# Patient Record
(demographics unavailable — no encounter records)

---

## 2021-04-06 NOTE — EMERGENCY DEPARTMENT REPORT
Minor Respiratory





- [facility]


Chief Complaint: Upper Respiratory Infection


Stated Complaint: COUGH


Time Seen by Provider: 04/06/21 22:55


Duration: 8 days


Severity: moderate


Minor Respiratory: Yes Rhinorrhea, Yes Cough, Yes Shortness of Breath


Other History: 58-year-old -American male with a current history of COPD 

and asthma presents to the emergency room complaining of shortness of breath and

cough that is steady for the last 8 days.  Patient denies any fever chills no 

nausea no vomiting.  Patient reported to nurse that he has been coughing up 

yellow mucus.





ED Review of Systems


ROS: 


Stated complaint: COUGH


Other details as noted in HPI








ED Past Medical Hx





- Social History


Smoking Status: Current Every Day Smoker





- Medications


Home Medications: 


                                Home Medications











 Medication  Instructions  Recorded  Confirmed  Last Taken  Type


 


Azithromycin [Zithromax Z-JANET] 250 mg PO DAILY #6 tab 04/07/21  Unknown Rx


 


Benzonatate [Tessalon Perles] 100 mg PO Q8HR #15 capsule 04/07/21  Unknown Rx


 


Prednisone [predniSONE 5 mg (6-Day 5 mg PO .TAPER #1 tab.ds.pk 04/07/21  Unknown

 Rx





Pack, 21 Tabs)]     














Minor Respiratory Exam





- Exam


General: 


Vital signs noted. No distress. Alert and acting appropriately.





HEENT: Yes Moist Mucous Membranes, No Pharyngeal Erythema, No Pharyngeal 

Exudates, No Rhinorrhea, No Conjuctival Injection, No Frontal Tenderness, No 

Maxillary Tenderness


Neck: Yes Supple, No Adenopathy


Lungs: Yes Good Air Exchange, Yes Cough, No Wheezes, No Ronchi, No Stridor, No 

Labored Respirations, No Retractions, No Use of Accessory Muscles, No Other 

Abnormal Lung Sounds


Heart: Yes Regular, No Murmur


Abdomen: Yes Normal Bowel Sounds, No Tenderness, No Peritoneal Signs


Skin: No Rash, No Edema


Neurologic: 


Alert and oriented, no deficits.








Musculoskeletal: 


Unremarkable.











ED Course


                                   Vital Signs











  04/06/21





  22:54


 


Temperature 98.5 F


 


Pulse Rate 101 H


 


Respiratory 20





Rate 


 


Blood Pressure 137/88


 


O2 Sat by Pulse 95





Oximetry 














- Reevaluation(s)


Reevaluation #1: 





04/07/21 00:11


Patient lung exam is clear to auscultation.





ED Medical Decision Making





- Radiology Data


Radiology results: report reviewed





58-year-old -American male with a current history of COPD and asthma 

presents to the emergency room complaining of shortness of breath and cough that

is steady for the last 8 days.  Patient denies any fever chills no nausea no 

vomiting.  Patient reported to nurse that he has been coughing up yellow mucus.








Chest x-ray is negative.  Patient be treated for bronchitis with Zithromax 

prednisone taper Tessalon Perles.  Patient is encouraged to continue using his 

inhaler.





- Medical Decision Making





58-year-old -American male with a current history of COPD and asthma 

presents to the emergency room complaining of shortness of breath and cough that

is steady for the last 8 days.  Patient denies any fever chills no nausea no 

vomiting.  Patient reported to nurse that he has been coughing up yellow mucus.


Critical care attestation.: 


If time is entered above; I have spent that time in minutes in the direct care 

of this critically ill patient, excluding procedure time.








ED Disposition


Clinical Impression: 


 Bronchitis





Disposition: DC-01 TO HOME OR SELFCARE


Is pt being admited?: No


Does the pt Need Aspirin: No


Condition: Stable


Instructions:  Chronic Bronchitis (ED), Upper Respiratory Infection, Adult, 

Easy-to-Read


Additional Instructions: 


Please take medications as prescribed.  Follow-up with your primary care 

provider.


Prescriptions: 


Prednisone [predniSONE 5 mg (6-Day Pack, 21 Tabs)] 5 mg PO .TAPER #1 tab.ds.pk


Benzonatate [Tessalon Perles] 100 mg PO Q8HR #15 capsule


Azithromycin [Zithromax Z-JANET] 250 mg PO DAILY #6 tab


Referrals: 


[facility] [Provider Group] - 3-5 Days


Forms:  Work/School Release Form(ED)

## 2021-04-06 NOTE — XRAY REPORT
CHEST 2 VIEWS 



INDICATION / CLINICAL INFORMATION: sob,cough and rales.



COMPARISON: None available.



FINDINGS:



SUPPORT DEVICES: None.

HEART / MEDIASTINUM: No significant abnormality. 

LUNGS / PLEURA: No significant pulmonary or pleural abnormality. No pneumothorax. 



ADDITIONAL FINDINGS: No significant additional findings.



IMPRESSION:

1. No acute findings.



Signer Name: Jemal Gold MD 

Signed: 4/6/2021 11:34 PM

Workstation Name: [id number]